# Patient Record
Sex: FEMALE | Race: WHITE | NOT HISPANIC OR LATINO
[De-identification: names, ages, dates, MRNs, and addresses within clinical notes are randomized per-mention and may not be internally consistent; named-entity substitution may affect disease eponyms.]

---

## 2021-03-30 ENCOUNTER — APPOINTMENT (OUTPATIENT)
Age: 36
End: 2021-03-30
Payer: COMMERCIAL

## 2021-03-30 PROCEDURE — 0001A: CPT

## 2021-04-20 ENCOUNTER — APPOINTMENT (OUTPATIENT)
Age: 36
End: 2021-04-20
Payer: COMMERCIAL

## 2021-04-20 PROCEDURE — 0002A: CPT

## 2024-03-06 ENCOUNTER — APPOINTMENT (OUTPATIENT)
Dept: OTOLARYNGOLOGY | Facility: CLINIC | Age: 39
End: 2024-03-06
Payer: COMMERCIAL

## 2024-03-06 VITALS
HEART RATE: 73 BPM | OXYGEN SATURATION: 100 % | DIASTOLIC BLOOD PRESSURE: 79 MMHG | WEIGHT: 140 LBS | BODY MASS INDEX: 23.32 KG/M2 | HEIGHT: 65 IN | TEMPERATURE: 97.9 F | SYSTOLIC BLOOD PRESSURE: 128 MMHG

## 2024-03-06 DIAGNOSIS — Z78.9 OTHER SPECIFIED HEALTH STATUS: ICD-10-CM

## 2024-03-06 DIAGNOSIS — Z80.9 FAMILY HISTORY OF MALIGNANT NEOPLASM, UNSPECIFIED: ICD-10-CM

## 2024-03-06 PROBLEM — Z00.00 ENCOUNTER FOR PREVENTIVE HEALTH EXAMINATION: Status: ACTIVE | Noted: 2024-03-06

## 2024-03-06 PROCEDURE — 31231 NASAL ENDOSCOPY DX: CPT

## 2024-03-06 PROCEDURE — 99204 OFFICE O/P NEW MOD 45 MIN: CPT | Mod: 25

## 2024-03-06 RX ORDER — PREDNISONE 10 MG/1
10 TABLET ORAL
Qty: 30 | Refills: 0 | Status: ACTIVE | COMMUNITY
Start: 2024-03-06 | End: 1900-01-01

## 2024-03-06 RX ORDER — ELASTIC BANDAGE 1"X2.2YD
2300-700 BANDAGE TOPICAL
Qty: 1 | Refills: 0 | Status: ACTIVE | COMMUNITY
Start: 2024-03-06 | End: 1900-01-01

## 2024-03-06 RX ORDER — FLUTICASONE PROPIONATE 50 UG/1
50 SPRAY, METERED NASAL TWICE DAILY
Qty: 1 | Refills: 3 | Status: ACTIVE | COMMUNITY
Start: 2024-03-06 | End: 1900-01-01

## 2024-03-06 RX ORDER — SULFAMETHOXAZOLE AND TRIMETHOPRIM 800; 160 MG/1; MG/1
800-160 TABLET ORAL TWICE DAILY
Qty: 28 | Refills: 0 | Status: ACTIVE | COMMUNITY
Start: 2024-03-06 | End: 1900-01-01

## 2024-03-06 NOTE — HISTORY OF PRESENT ILLNESS
[de-identified] : CC: sinus issues   HISTORY OF PRESENT ILLNESS: Areli Klein is a 39 y/o female who presents today with sinus issues x many years she reports that she has frontal headaches that get significantly worse when she flies, specifically on the descent  she also has severe sinus congestion that is worse on the right side. she also endorses rhinorrhea and severe nasal congestion she has tried flonase, mucinex OTC allergy med with no relief denies change in sense of smell, hx of asthma or aspirin sensitivity    Environmental Allergies: yes Immunotherapy: no Smoker: no Asthma: no ASA sensitivity: no History of Autoimmune Disease: no History of Immune Deficiency: no   Key Elements at least 4 described: Location: R side Severity: severe Quality: pain, headaches Timing: intermittent Duration: years Modifying Factors: none Associated signs and symptoms: see above   REVIEW OF SYSTEMS: General ROS: negative for - chills, fatigue or fever Psychological ROS: negative for - anxiety or depression Ophthalmic ROS: negative for - blurry vision, decreased vision or double vision ENT ROS: negative except as noted from HPI Allergy and Immunology ROS: negative except as noted from HPI Hematological and Lymphatic ROS: negative for - bleeding problems Endocrine ROS: negative for - malaise/lethargy Respiratory ROS: negative for - stridor Cardiovascular ROS: negative for - chest pain Gastrointestinal ROS: negative for - appetite loss or nausea/vomiting Genitourinary ROS: negative for - incontinence Musculoskeletal ROS: negative for - gait disturbance Neurological ROS: negative for - behavioral changes Dermatological ROS: negative for - nail changes   Physical Exam:   GENERAL APPEARANCE: Well-developed and No Acute Distress. COMMUNICATION: Able to Communicate. Strong Voice.   HEAD AND FACE Eyes: Testing of ocular motility including primary gaze alignment normal. Inspection and Appearance: No evidence of lesions or masses Palpation: Palpation of the face reveals no sinus tenderness Salivary Glands: Symmetric without masses Facial Strength: Symmetric without evidence of facial paralysis   EAR, NOSE, MOUTH, and THROAT: Ear Canals and Tympanic Membranes, Bilateral: No evidence of inflammation or lesions. Thresholds: Clinical speech reception thresholds normal. External, Nose and Auricle: No lesions or masses. Nasal, Mucosa, Septum, and Turbinates: See endoscopy.   NECK: Evaluation: No evidence of masses or crepitus. The neck is symmetric and the trachea is in the midline position. Thyroid: No evidence of enlargement, tenderness or mass. Neck Lymph Nodes: WNL. Respiratory: Inspection of the chest including symmetry, expansion and/or assessment of respiratory effort normal. Cardiovascular: Evaluation of peripheral vascular system by observation and palpation of capillary refill, normal. Neurological/Psychiatric: Alert, Oriented, Mood, and Affect Normal.   IMAGING:   PROCEDURE: Nasal endoscopy (28911)   SURGEON: Rakesh Almazan MD   Prior to the procedure, I had a discussion with the patient regarding the risks, benefits, and alternatives of the procedure and a verbal consent was obtained.   After obtaining adequate decongestion of the nasal mucosa with topical Epinephrine and adequate anesthesia with topical Lidocaine nasal spray, the nasal endoscope was used to examine the nasal passages and paranasal sinuses. The endoscope was passed along the floor of the nose bilaterally to evaluate the inferior meatus, floor of the nose, inferior turbinate, and nasopharynx. The scope was then passed superiorly to evaluate the area of the sphenoethmoidal recess, superior turbinate and superior meatus. As the scope was withdrawn anteriorly, the middle turbinate and middle meatus were carefully inspected. The endoscope was withdrawn and the patient tolerated the procedure well. No complications were encountered.   INSTRUMENTS: rigid 45   EXAM FINDINGS: slight left septal deformity. 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

## 2024-03-07 ENCOUNTER — TRANSCRIPTION ENCOUNTER (OUTPATIENT)
Age: 39
End: 2024-03-07

## 2024-03-28 ENCOUNTER — OUTPATIENT (OUTPATIENT)
Dept: OUTPATIENT SERVICES | Facility: HOSPITAL | Age: 39
LOS: 1 days | End: 2024-03-28
Payer: COMMERCIAL

## 2024-03-28 ENCOUNTER — APPOINTMENT (OUTPATIENT)
Dept: CT IMAGING | Facility: IMAGING CENTER | Age: 39
End: 2024-03-28
Payer: COMMERCIAL

## 2024-03-28 DIAGNOSIS — J33.9 NASAL POLYP, UNSPECIFIED: ICD-10-CM

## 2024-03-28 DIAGNOSIS — J32.0 CHRONIC MAXILLARY SINUSITIS: ICD-10-CM

## 2024-03-28 DIAGNOSIS — J32.1 CHRONIC FRONTAL SINUSITIS: ICD-10-CM

## 2024-03-28 PROCEDURE — 70486 CT MAXILLOFACIAL W/O DYE: CPT | Mod: 26

## 2024-03-28 PROCEDURE — 70486 CT MAXILLOFACIAL W/O DYE: CPT

## 2024-04-24 ENCOUNTER — APPOINTMENT (OUTPATIENT)
Dept: OTOLARYNGOLOGY | Facility: CLINIC | Age: 39
End: 2024-04-24
Payer: COMMERCIAL

## 2024-04-24 VITALS
OXYGEN SATURATION: 100 % | WEIGHT: 140 LBS | BODY MASS INDEX: 23.32 KG/M2 | DIASTOLIC BLOOD PRESSURE: 70 MMHG | HEIGHT: 65 IN | HEART RATE: 75 BPM | SYSTOLIC BLOOD PRESSURE: 124 MMHG | TEMPERATURE: 97.9 F

## 2024-04-24 DIAGNOSIS — J33.9 NASAL POLYP, UNSPECIFIED: ICD-10-CM

## 2024-04-24 DIAGNOSIS — J34.2 DEVIATED NASAL SEPTUM: ICD-10-CM

## 2024-04-24 DIAGNOSIS — J32.0 CHRONIC MAXILLARY SINUSITIS: ICD-10-CM

## 2024-04-24 DIAGNOSIS — J32.2 CHRONIC ETHMOIDAL SINUSITIS: ICD-10-CM

## 2024-04-24 DIAGNOSIS — J32.1 CHRONIC FRONTAL SINUSITIS: ICD-10-CM

## 2024-04-24 DIAGNOSIS — J32.3 CHRONIC SPHENOIDAL SINUSITIS: ICD-10-CM

## 2024-04-24 PROCEDURE — 99214 OFFICE O/P EST MOD 30 MIN: CPT

## 2024-04-24 NOTE — HISTORY OF PRESENT ILLNESS
[de-identified] : CC: sinus issues   HISTORY OF PRESENT ILLNESS: Areli Klein is a 37 y/o female who presents today with sinus issues x many years she reports that she has frontal headaches that get significantly worse when she flies, specifically on the descent  she also has severe sinus congestion that is worse on the right side. she also endorses rhinorrhea and severe nasal congestion she has tried flonase, mucinex OTC allergy med with no relief denies change in sense of smell, hx of asthma or aspirin sensitivity    Environmental Allergies: yes Immunotherapy: no Smoker: no Asthma: no ASA sensitivity: no History of Autoimmune Disease: no History of Immune Deficiency: no   Key Elements at least 4 described: Location: R side Severity: severe Quality: pain, headaches Timing: intermittent Duration: years Modifying Factors: none Associated signs and symptoms: see above   6 week follow up s/p pred40/bactrim/flonase/netipot not clear she benefitted from the pred as she developed a fever and rash with bactrim which she stopped post treatment CT shows significant left DNS and right worse than left sinusitis  REVIEW OF SYSTEMS: General ROS: negative for - chills, fatigue or fever Psychological ROS: negative for - anxiety or depression Ophthalmic ROS: negative for - blurry vision, decreased vision or double vision ENT ROS: negative except as noted from HPI Allergy and Immunology ROS: negative except as noted from HPI Hematological and Lymphatic ROS: negative for - bleeding problems Endocrine ROS: negative for - malaise/lethargy Respiratory ROS: negative for - stridor Cardiovascular ROS: negative for - chest pain Gastrointestinal ROS: negative for - appetite loss or nausea/vomiting Genitourinary ROS: negative for - incontinence Musculoskeletal ROS: negative for - gait disturbance Neurological ROS: negative for - behavioral changes Dermatological ROS: negative for - nail changes   Physical Exam:   GENERAL APPEARANCE: Well-developed and No Acute Distress. COMMUNICATION: Able to Communicate. Strong Voice.   HEAD AND FACE Eyes: Testing of ocular motility including primary gaze alignment normal. Inspection and Appearance: No evidence of lesions or masses Palpation: Palpation of the face reveals no sinus tenderness Salivary Glands: Symmetric without masses Facial Strength: Symmetric without evidence of facial paralysis   EAR, NOSE, MOUTH, and THROAT: Ear Canals and Tympanic Membranes, Bilateral: No evidence of inflammation or lesions. Thresholds: Clinical speech reception thresholds normal. External, Nose and Auricle: No lesions or masses. Nasal, Mucosa, Septum, and Turbinates: See endoscopy.   NECK: Evaluation: No evidence of masses or crepitus. The neck is symmetric and the trachea is in the midline position. Thyroid: No evidence of enlargement, tenderness or mass. Neck Lymph Nodes: WNL. Respiratory: Inspection of the chest including symmetry, expansion and/or assessment of respiratory effort normal. Cardiovascular: Evaluation of peripheral vascular system by observation and palpation of capillary refill, normal. Neurological/Psychiatric: Alert, Oriented, Mood, and Affect Normal.   IMAGING:   previous visit PROCEDURE: Nasal endoscopy (63262)   SURGEON: Rakesh Almazan MD   Prior to the procedure, I had a discussion with the patient regarding the risks, benefits, and alternatives of the procedure and a verbal consent was obtained.   After obtaining adequate decongestion of the nasal mucosa with topical Epinephrine and adequate anesthesia with topical Lidocaine nasal spray, the nasal endoscope was used to examine the nasal passages and paranasal sinuses. The endoscope was passed along the floor of the nose bilaterally to evaluate the inferior meatus, floor of the nose, inferior turbinate, and nasopharynx. The scope was then passed superiorly to evaluate the area of the sphenoethmoidal recess, superior turbinate and superior meatus. As the scope was withdrawn anteriorly, the middle turbinate and middle meatus were carefully inspected. The endoscope was withdrawn and the patient tolerated the procedure well. No complications were encountered.   INSTRUMENTS: rigid 45   EXAM FINDINGS: slight left septal deformity. 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 Despite these attempts at treatment, the patient continues to complain of nasal congestion, discolored nasal drainage, and a disturbance of smell and taste function. The patient has undergone CT scanning that has findings that are consistent with chronic sinusitis.  Therefore, based on the symptoms of chronic sinusitis,objective findings of CT scan consistent with chronic sinusitis, the time course of symptoms and failure of medical treatment, this patient meets the indications for surgical intervention and requres endoscopic sinus surgery in attempt to relieve symptoms.  The need for maintenance with daily nasal irrigations with saline/topical steroids, and antihistamine to minimize the chronic inflammation was discussed with the patient.   The risks of endoscopic sinus surgery were also discussed in detail with the patient. More specifically, the risks of CSF leak, orbital injury with temporary or permanent vision loss, nasal bleeding, epiphora, loss of smell, and the potential for persistence of their underlying problem.    Rakesh Almazan MD [facility] Rhinology and Skull Base Surgery Department of Otolaryngology- Head and Neck Surgery [facility]

## 2024-08-20 ENCOUNTER — RESULT REVIEW (OUTPATIENT)
Age: 39
End: 2024-08-20

## 2024-08-20 ENCOUNTER — APPOINTMENT (OUTPATIENT)
Age: 39
End: 2024-08-20
Payer: COMMERCIAL

## 2024-08-20 PROCEDURE — 30520 REPAIR OF NASAL SEPTUM: CPT

## 2024-08-20 PROCEDURE — 30930 THER FX NASAL INF TURBINATE: CPT

## 2024-08-20 PROCEDURE — 31267 ENDOSCOPY MAXILLARY SINUS: CPT | Mod: 50

## 2024-08-20 PROCEDURE — 31276 NSL/SINS NDSC FRNT TISS RMVL: CPT | Mod: 50

## 2024-08-20 PROCEDURE — 61782 SCAN PROC CRANIAL EXTRA: CPT

## 2024-08-20 PROCEDURE — 31259 NSL/SINS NDSC SPHN TISS RMVL: CPT | Mod: 50

## 2024-08-29 ENCOUNTER — APPOINTMENT (OUTPATIENT)
Dept: OTOLARYNGOLOGY | Facility: CLINIC | Age: 39
End: 2024-08-29

## 2024-08-29 PROCEDURE — 31237 NSL/SINS NDSC SURG BX POLYPC: CPT | Mod: 50,79

## 2024-08-29 RX ORDER — BUDESONIDE 1 MG/2ML
1 INHALANT ORAL
Qty: 180 | Refills: 3 | Status: ACTIVE | COMMUNITY
Start: 2024-08-29 | End: 1900-01-01

## 2024-08-30 NOTE — PROCEDURE
[FreeTextEntry3] : HISTORY OF PRESENT ILLNESS   Areli Klein is s/p septoplasty and bFESS on 8/20/24. Currently on clinda, pred 20, NSI. Complaints: overall doing well, minimal pain   Physical Exam General: AAO x 3, WDWN  Ears: Canals clear, TM;s nrml  Nose: See endoscopy  Throat: uvula midline. No masses or lesions  Eyes: PERRL, EOMI  Neuro: Gross nrml, CN 2-12 intact  Resp: Nrml chest excursion  Skin: Appears intact   Procedure Note   PROCEDURE: Nasal/sinus endoscopy, surgical, with debridement ([id number])   INDICATION: CRSwNP, DNS   Prior to the procedure, I had a discussion with the patient regarding the risks, benefits, and alternatives of the debridement and they signed a consent.   SURGEON: Dr. Rakesh Almazan   PROCEDURE DETAIL: After obtaining adequate decongestion of the nasal mucosa with ephedrine nasal spray, and adequate anesthesia with 2% topical Lidocaine nasal spray, the nasal endoscope was used to examine the nasal passages and paranasal sinuses. Using a combination of suction, grasping instruments and swabs, the maxillary, ethmoid, frontal and sphenoid sinuses were debrided bilaterally of pus, crust, debris, clots and polyps to prevent scar formation, continued sinusitis and mucocele formation. At the end of this procedure, all operated sinuses were patent. The endoscope was withdrawn, and the patient tolerated the procedure well. No complications were encountered.   INSTRUMENTS: rigid 45   ENDOSCOPIC FINDINGS: doyles removed. extensive debris removed, right worse than left. all operated sinuses widely patent   Impression: Areli Klein is s/p septoplasty and bFESS on 8/20/24   Plan: - complete course of abx - taper to 10mg of prednisone for 1 week and 5 mg for 1 week - continue NSI 4x/day with saline and add in budesonide 2x/day  - RTC in 1 week    The patient was given an opportunity to ask questions, and all questions asked were answered to the best of my ability.     Rakesh Almazan MD [facility] Rhinology and Skull Base Surgery Department of Otolaryngology- Head and Neck Surgery [facility]

## 2024-08-30 NOTE — PROCEDURE
[FreeTextEntry3] : HISTORY OF PRESENT ILLNESS   Areli Kelin is s/p septoplasty and bFESS on 8/20/24. Currently on clinda, pred 20, NSI. Complaints: overall doing well, minimal pain   Physical Exam General: AAO x 3, WDWN  Ears: Canals clear, TM;s nrml  Nose: See endoscopy  Throat: uvula midline. No masses or lesions  Eyes: PERRL, EOMI  Neuro: Gross nrml, CN 2-12 intact  Resp: Nrml chest excursion  Skin: Appears intact   Procedure Note   PROCEDURE: Nasal/sinus endoscopy, surgical, with debridement ([id number])   INDICATION: CRSwNP, DNS   Prior to the procedure, I had a discussion with the patient regarding the risks, benefits, and alternatives of the debridement and they signed a consent.   SURGEON: Dr. Rakesh Almazan   PROCEDURE DETAIL: After obtaining adequate decongestion of the nasal mucosa with ephedrine nasal spray, and adequate anesthesia with 2% topical Lidocaine nasal spray, the nasal endoscope was used to examine the nasal passages and paranasal sinuses. Using a combination of suction, grasping instruments and swabs, the maxillary, ethmoid, frontal and sphenoid sinuses were debrided bilaterally of pus, crust, debris, clots and polyps to prevent scar formation, continued sinusitis and mucocele formation. At the end of this procedure, all operated sinuses were patent. The endoscope was withdrawn, and the patient tolerated the procedure well. No complications were encountered.   INSTRUMENTS: rigid 45   ENDOSCOPIC FINDINGS: doyles removed. extensive debris removed, right worse than left. all operated sinuses widely patent   Impression: Areli Klein is s/p septoplasty and bFESS on 8/20/24   Plan: - complete course of abx - taper to 10mg of prednisone for 1 week and 5 mg for 1 week - continue NSI 4x/day with saline and add in budesonide 2x/day  - RTC in 1 week    The patient was given an opportunity to ask questions, and all questions asked were answered to the best of my ability.     Rakesh Almazan MD [facility] Rhinology and Skull Base Surgery Department of Otolaryngology- Head and Neck Surgery [facility]

## 2024-08-30 NOTE — PROCEDURE
[FreeTextEntry3] : HISTORY OF PRESENT ILLNESS   Areli Klein is s/p septoplasty and bFESS on 8/20/24. Currently on clinda, pred 20, NSI. Complaints: overall doing well, minimal pain   Physical Exam General: AAO x 3, WDWN  Ears: Canals clear, TM;s nrml  Nose: See endoscopy  Throat: uvula midline. No masses or lesions  Eyes: PERRL, EOMI  Neuro: Gross nrml, CN 2-12 intact  Resp: Nrml chest excursion  Skin: Appears intact   Procedure Note   PROCEDURE: Nasal/sinus endoscopy, surgical, with debridement ([id number])   INDICATION: CRSwNP, DNS   Prior to the procedure, I had a discussion with the patient regarding the risks, benefits, and alternatives of the debridement and they signed a consent.   SURGEON: Dr. Rakesh Almazan   PROCEDURE DETAIL: After obtaining adequate decongestion of the nasal mucosa with ephedrine nasal spray, and adequate anesthesia with 2% topical Lidocaine nasal spray, the nasal endoscope was used to examine the nasal passages and paranasal sinuses. Using a combination of suction, grasping instruments and swabs, the maxillary, ethmoid, frontal and sphenoid sinuses were debrided bilaterally of pus, crust, debris, clots and polyps to prevent scar formation, continued sinusitis and mucocele formation. At the end of this procedure, all operated sinuses were patent. The endoscope was withdrawn, and the patient tolerated the procedure well. No complications were encountered.   INSTRUMENTS: rigid 45   ENDOSCOPIC FINDINGS: doyles removed. extensive debris removed, right worse than left. all operated sinuses widely patent   Impression: Areli Klein is s/p septoplasty and bFESS on 8/20/24   Plan: - complete course of abx - taper to 10mg of prednisone for 1 week and 5 mg for 1 week - continue NSI 4x/day with saline and add in budesonide 2x/day  - RTC in 1 week    The patient was given an opportunity to ask questions, and all questions asked were answered to the best of my ability.     Rakesh Almazan MD [facility] Rhinology and Skull Base Surgery Department of Otolaryngology- Head and Neck Surgery [facility]

## 2024-09-06 ENCOUNTER — APPOINTMENT (OUTPATIENT)
Dept: OTOLARYNGOLOGY | Facility: CLINIC | Age: 39
End: 2024-09-06
Payer: COMMERCIAL

## 2024-09-06 VITALS — BODY MASS INDEX: 22.99 KG/M2 | WEIGHT: 138 LBS | HEIGHT: 65 IN

## 2024-09-06 DIAGNOSIS — J34.2 DEVIATED NASAL SEPTUM: ICD-10-CM

## 2024-09-06 DIAGNOSIS — J32.2 CHRONIC ETHMOIDAL SINUSITIS: ICD-10-CM

## 2024-09-06 DIAGNOSIS — J32.3 CHRONIC SPHENOIDAL SINUSITIS: ICD-10-CM

## 2024-09-06 DIAGNOSIS — J32.1 CHRONIC FRONTAL SINUSITIS: ICD-10-CM

## 2024-09-06 DIAGNOSIS — J33.9 NASAL POLYP, UNSPECIFIED: ICD-10-CM

## 2024-09-06 DIAGNOSIS — J32.0 CHRONIC MAXILLARY SINUSITIS: ICD-10-CM

## 2024-09-06 PROCEDURE — 31237 NSL/SINS NDSC SURG BX POLYPC: CPT | Mod: 50,79

## 2024-09-08 NOTE — PROCEDURE
[FreeTextEntry3] : HISTORY OF PRESENT ILLNESS   Areli Klein is s/p septoplasty and bFESS on 8/20/24. Currently on pred 10, NSI w/ budesonide. Complaints: overall doing well, minimal pain, much better   Physical Exam General: AAO x 3, WDWN  Ears: Canals clear, TM;s nrml  Nose: See endoscopy  Throat: uvula midline. No masses or lesions  Eyes: PERRL, EOMI  Neuro: Gross nrml, CN 2-12 intact  Resp: Nrml chest excursion  Skin: Appears intact   Procedure Note   PROCEDURE: Nasal/sinus endoscopy, surgical, with debridement ([id number])   INDICATION: CRSwNP, DNS   Prior to the procedure, I had a discussion with the patient regarding the risks, benefits, and alternatives of the debridement and they signed a consent.   SURGEON: Dr. Rakesh Almazan   PROCEDURE DETAIL: After obtaining adequate decongestion of the nasal mucosa with ephedrine nasal spray, and adequate anesthesia with 2% topical Lidocaine nasal spray, the nasal endoscope was used to examine the nasal passages and paranasal sinuses. Using a combination of suction, grasping instruments and swabs, the maxillary, ethmoid, frontal and sphenoid sinuses were debrided bilaterally of pus, crust, debris, clots and polyps to prevent scar formation, continued sinusitis and mucocele formation. At the end of this procedure, all operated sinuses were patent. The endoscope was withdrawn, and the patient tolerated the procedure well. No complications were encountered.   INSTRUMENTS: rigid 45   ENDOSCOPIC FINDINGS: all debris removed, right worse than left. much improved all operated sinuses widely patent   Impression: Areli Klein is s/p septoplasty and bFESS on 8/20/24   Plan: - taper prednisone to 5 mg for 1 week then off - continue NSI 4x/day with saline and add in budesonide 2x/day  - RTC in 2 weeks    The patient was given an opportunity to ask questions, and all questions asked were answered to the best of my ability.     Rakesh Almazan MD Located within [facility] Rhinology and Skull Base Surgery Department of Otolaryngology- Head and Neck Surgery [facility]

## 2024-09-08 NOTE — PROCEDURE
[FreeTextEntry3] : HISTORY OF PRESENT ILLNESS   Areli Klein is s/p septoplasty and bFESS on 8/20/24. Currently on pred 10, NSI w/ budesonide. Complaints: overall doing well, minimal pain, much better   Physical Exam General: AAO x 3, WDWN  Ears: Canals clear, TM;s nrml  Nose: See endoscopy  Throat: uvula midline. No masses or lesions  Eyes: PERRL, EOMI  Neuro: Gross nrml, CN 2-12 intact  Resp: Nrml chest excursion  Skin: Appears intact   Procedure Note   PROCEDURE: Nasal/sinus endoscopy, surgical, with debridement ([id number])   INDICATION: CRSwNP, DNS   Prior to the procedure, I had a discussion with the patient regarding the risks, benefits, and alternatives of the debridement and they signed a consent.   SURGEON: Dr. Rakesh Almazan   PROCEDURE DETAIL: After obtaining adequate decongestion of the nasal mucosa with ephedrine nasal spray, and adequate anesthesia with 2% topical Lidocaine nasal spray, the nasal endoscope was used to examine the nasal passages and paranasal sinuses. Using a combination of suction, grasping instruments and swabs, the maxillary, ethmoid, frontal and sphenoid sinuses were debrided bilaterally of pus, crust, debris, clots and polyps to prevent scar formation, continued sinusitis and mucocele formation. At the end of this procedure, all operated sinuses were patent. The endoscope was withdrawn, and the patient tolerated the procedure well. No complications were encountered.   INSTRUMENTS: rigid 45   ENDOSCOPIC FINDINGS: all debris removed, right worse than left. much improved all operated sinuses widely patent   Impression: Areli Klein is s/p septoplasty and bFESS on 8/20/24   Plan: - taper prednisone to 5 mg for 1 week then off - continue NSI 4x/day with saline and add in budesonide 2x/day  - RTC in 2 weeks    The patient was given an opportunity to ask questions, and all questions asked were answered to the best of my ability.     Rakesh Almazan MD [facility] Rhinology and Skull Base Surgery Department of Otolaryngology- Head and Neck Surgery [facility]

## 2024-09-16 ENCOUNTER — APPOINTMENT (OUTPATIENT)
Dept: OTOLARYNGOLOGY | Facility: CLINIC | Age: 39
End: 2024-09-16
Payer: COMMERCIAL

## 2024-09-16 DIAGNOSIS — J33.9 NASAL POLYP, UNSPECIFIED: ICD-10-CM

## 2024-09-16 DIAGNOSIS — J32.2 CHRONIC ETHMOIDAL SINUSITIS: ICD-10-CM

## 2024-09-16 DIAGNOSIS — J34.2 DEVIATED NASAL SEPTUM: ICD-10-CM

## 2024-09-16 DIAGNOSIS — J32.3 CHRONIC SPHENOIDAL SINUSITIS: ICD-10-CM

## 2024-09-16 DIAGNOSIS — J32.0 CHRONIC MAXILLARY SINUSITIS: ICD-10-CM

## 2024-09-16 DIAGNOSIS — J32.1 CHRONIC FRONTAL SINUSITIS: ICD-10-CM

## 2024-09-16 PROCEDURE — 31237 NSL/SINS NDSC SURG BX POLYPC: CPT | Mod: 50,79

## 2024-09-16 NOTE — PROCEDURE
[FreeTextEntry3] : HISTORY OF PRESENT ILLNESS   Areli Klein is s/p septoplasty and bFESS on 8/20/24. Currently on NSI w/ budesonide. Complaints: she reports having a difficult week with severe sinus pressure/headache mostly on the R side. it has required her to take tylenol daily    Physical Exam General: AAO x 3, WDWN  Ears: Canals clear, TM;s nrml  Nose: See endoscopy  Throat: uvula midline. No masses or lesions  Eyes: PERRL, EOMI  Neuro: Gross nrml, CN 2-12 intact  Resp: Nrml chest excursion  Skin: Appears intact   Procedure Note   PROCEDURE: Nasal/sinus endoscopy, surgical, with debridement ([id number])   INDICATION: CRSwNP, DNS   Prior to the procedure, I had a discussion with the patient regarding the risks, benefits, and alternatives of the debridement and they signed a consent.   SURGEON: Dr. Rakesh Almazan   PROCEDURE DETAIL: After obtaining adequate decongestion of the nasal mucosa with ephedrine nasal spray, and adequate anesthesia with 2% topical Lidocaine nasal spray, the nasal endoscope was used to examine the nasal passages and paranasal sinuses. Using a combination of suction, grasping instruments and swabs, the maxillary, ethmoid, frontal and sphenoid sinuses were debrided bilaterally of pus, crust, debris, clots and polyps to prevent scar formation, continued sinusitis and mucocele formation. At the end of this procedure, all operated sinuses were patent. The endoscope was withdrawn, and the patient tolerated the procedure well. No complications were encountered.   INSTRUMENTS: rigid 45   ENDOSCOPIC FINDINGS: thick mucus and debris removed mostly left frontal. much improved edema. all operated sinuses widely patent   Impression: Areli Klein is s/p septoplasty and bFESS on 8/20/24   Plan: - encouraged taking aleve for the pain if necessary - continue NSI 4x/day with saline and add in budesonide 2x/day  - RTC in 4-6 weeks    The patient was given an opportunity to ask questions, and all questions asked were answered to the best of my ability.     Rakesh Almazan MD [facility] Rhinology and Skull Base Surgery Department of Otolaryngology- Head and Neck Surgery [facility]

## 2024-09-16 NOTE — PROCEDURE
[FreeTextEntry3] : HISTORY OF PRESENT ILLNESS   Areli Klein is s/p septoplasty and bFESS on 8/20/24. Currently on NSI w/ budesonide. Complaints: she reports having a difficult week with severe sinus pressure/headache mostly on the R side. it has required her to take tylenol daily    Physical Exam General: AAO x 3, WDWN  Ears: Canals clear, TM;s nrml  Nose: See endoscopy  Throat: uvula midline. No masses or lesions  Eyes: PERRL, EOMI  Neuro: Gross nrml, CN 2-12 intact  Resp: Nrml chest excursion  Skin: Appears intact   Procedure Note   PROCEDURE: Nasal/sinus endoscopy, surgical, with debridement ([id number])   INDICATION: CRSwNP, DNS   Prior to the procedure, I had a discussion with the patient regarding the risks, benefits, and alternatives of the debridement and they signed a consent.   SURGEON: Dr. Rakesh Almazan   PROCEDURE DETAIL: After obtaining adequate decongestion of the nasal mucosa with ephedrine nasal spray, and adequate anesthesia with 2% topical Lidocaine nasal spray, the nasal endoscope was used to examine the nasal passages and paranasal sinuses. Using a combination of suction, grasping instruments and swabs, the maxillary, ethmoid, frontal and sphenoid sinuses were debrided bilaterally of pus, crust, debris, clots and polyps to prevent scar formation, continued sinusitis and mucocele formation. At the end of this procedure, all operated sinuses were patent. The endoscope was withdrawn, and the patient tolerated the procedure well. No complications were encountered.   INSTRUMENTS: rigid 45   ENDOSCOPIC FINDINGS: thick mucus and debris removed mostly left frontal. much improved edema. all operated sinuses widely patent   Impression: Areli Klein is s/p septoplasty and bFESS on 8/20/24   Plan: - encouraged taking aleve for the pain if necessary - continue NSI 4x/day with saline and add in budesonide 2x/day  - RTC in 4-6 weeks    The patient was given an opportunity to ask questions, and all questions asked were answered to the best of my ability.     Rakesh Almazan MD [facility] Rhinology and Skull Base Surgery Department of Otolaryngology- Head and Neck Surgery [facility]

## 2024-10-09 ENCOUNTER — APPOINTMENT (OUTPATIENT)
Dept: OTOLARYNGOLOGY | Facility: CLINIC | Age: 39
End: 2024-10-09

## 2024-10-10 ENCOUNTER — APPOINTMENT (OUTPATIENT)
Dept: OTOLARYNGOLOGY | Facility: CLINIC | Age: 39
End: 2024-10-10
Payer: COMMERCIAL

## 2024-10-10 DIAGNOSIS — J32.2 CHRONIC ETHMOIDAL SINUSITIS: ICD-10-CM

## 2024-10-10 DIAGNOSIS — J32.3 CHRONIC SPHENOIDAL SINUSITIS: ICD-10-CM

## 2024-10-10 DIAGNOSIS — J33.9 NASAL POLYP, UNSPECIFIED: ICD-10-CM

## 2024-10-10 DIAGNOSIS — J32.0 CHRONIC MAXILLARY SINUSITIS: ICD-10-CM

## 2024-10-10 DIAGNOSIS — J32.1 CHRONIC FRONTAL SINUSITIS: ICD-10-CM

## 2024-10-10 DIAGNOSIS — J34.2 DEVIATED NASAL SEPTUM: ICD-10-CM

## 2024-10-10 PROCEDURE — 99213 OFFICE O/P EST LOW 20 MIN: CPT | Mod: 25

## 2025-01-09 ENCOUNTER — APPOINTMENT (OUTPATIENT)
Dept: OTOLARYNGOLOGY | Facility: CLINIC | Age: 40
End: 2025-01-09
Payer: COMMERCIAL

## 2025-01-09 DIAGNOSIS — J32.3 CHRONIC SPHENOIDAL SINUSITIS: ICD-10-CM

## 2025-01-09 DIAGNOSIS — J34.2 DEVIATED NASAL SEPTUM: ICD-10-CM

## 2025-01-09 DIAGNOSIS — J33.9 NASAL POLYP, UNSPECIFIED: ICD-10-CM

## 2025-01-09 DIAGNOSIS — J32.0 CHRONIC MAXILLARY SINUSITIS: ICD-10-CM

## 2025-01-09 DIAGNOSIS — J32.1 CHRONIC FRONTAL SINUSITIS: ICD-10-CM

## 2025-01-09 DIAGNOSIS — J32.2 CHRONIC ETHMOIDAL SINUSITIS: ICD-10-CM

## 2025-01-09 PROCEDURE — 99213 OFFICE O/P EST LOW 20 MIN: CPT | Mod: 25

## 2025-01-09 PROCEDURE — 31231 NASAL ENDOSCOPY DX: CPT

## 2025-01-09 RX ORDER — FLUTICASONE PROPIONATE 50 UG/1
50 SPRAY, METERED NASAL TWICE DAILY
Qty: 1 | Refills: 2 | Status: ACTIVE | COMMUNITY
Start: 2025-01-09 | End: 1900-01-01

## 2025-03-04 ENCOUNTER — RX RENEWAL (OUTPATIENT)
Age: 40
End: 2025-03-04

## 2025-07-15 ENCOUNTER — APPOINTMENT (OUTPATIENT)
Dept: COLORECTAL SURGERY | Facility: CLINIC | Age: 40
End: 2025-07-15
Payer: COMMERCIAL

## 2025-07-15 VITALS
RESPIRATION RATE: 14 BRPM | OXYGEN SATURATION: 100 % | BODY MASS INDEX: 23.32 KG/M2 | TEMPERATURE: 97.6 F | SYSTOLIC BLOOD PRESSURE: 118 MMHG | DIASTOLIC BLOOD PRESSURE: 82 MMHG | HEIGHT: 65 IN | WEIGHT: 140 LBS | HEART RATE: 73 BPM

## 2025-07-15 PROBLEM — Z87.19 HISTORY OF HEMORRHOIDS: Status: RESOLVED | Noted: 2025-07-15 | Resolved: 2025-07-15

## 2025-07-15 PROBLEM — K64.8 INTERNAL HEMORRHOIDS WITH COMPLICATION: Status: ACTIVE | Noted: 2025-07-15

## 2025-07-15 PROCEDURE — 99204 OFFICE O/P NEW MOD 45 MIN: CPT | Mod: 25

## 2025-07-15 PROCEDURE — 46600 DIAGNOSTIC ANOSCOPY SPX: CPT

## 2025-07-15 RX ORDER — HYDROCORTISONE 25 MG/G
2.5 CREAM TOPICAL
Qty: 30 | Refills: 6 | Status: ACTIVE | COMMUNITY
Start: 2025-07-15 | End: 1900-01-01

## 2025-07-15 RX ORDER — NITROGLYCERIN 400 UG/1
SPRAY ORAL
Refills: 0 | Status: ACTIVE | COMMUNITY

## 2025-08-19 ENCOUNTER — NON-APPOINTMENT (OUTPATIENT)
Age: 40
End: 2025-08-19

## 2025-08-21 ENCOUNTER — APPOINTMENT (OUTPATIENT)
Dept: OTOLARYNGOLOGY | Facility: CLINIC | Age: 40
End: 2025-08-21
Payer: COMMERCIAL

## 2025-08-21 DIAGNOSIS — J32.1 CHRONIC FRONTAL SINUSITIS: ICD-10-CM

## 2025-08-21 DIAGNOSIS — J33.9 NASAL POLYP, UNSPECIFIED: ICD-10-CM

## 2025-08-21 DIAGNOSIS — J32.3 CHRONIC SPHENOIDAL SINUSITIS: ICD-10-CM

## 2025-08-21 DIAGNOSIS — J34.2 DEVIATED NASAL SEPTUM: ICD-10-CM

## 2025-08-21 DIAGNOSIS — J32.0 CHRONIC MAXILLARY SINUSITIS: ICD-10-CM

## 2025-08-21 DIAGNOSIS — J32.2 CHRONIC ETHMOIDAL SINUSITIS: ICD-10-CM

## 2025-08-21 PROCEDURE — 31231 NASAL ENDOSCOPY DX: CPT

## 2025-08-21 PROCEDURE — 99213 OFFICE O/P EST LOW 20 MIN: CPT | Mod: 25

## 2025-09-03 ENCOUNTER — APPOINTMENT (OUTPATIENT)
Dept: COLORECTAL SURGERY | Facility: CLINIC | Age: 40
End: 2025-09-03